# Patient Record
Sex: MALE | Race: WHITE | NOT HISPANIC OR LATINO | URBAN - METROPOLITAN AREA
[De-identification: names, ages, dates, MRNs, and addresses within clinical notes are randomized per-mention and may not be internally consistent; named-entity substitution may affect disease eponyms.]

---

## 2017-02-13 ENCOUNTER — OUTPATIENT (OUTPATIENT)
Dept: OUTPATIENT SERVICES | Facility: HOSPITAL | Age: 62
LOS: 1 days | End: 2017-02-13
Payer: COMMERCIAL

## 2017-02-13 VITALS
WEIGHT: 169.98 LBS | SYSTOLIC BLOOD PRESSURE: 157 MMHG | RESPIRATION RATE: 16 BRPM | HEART RATE: 79 BPM | OXYGEN SATURATION: 97 % | DIASTOLIC BLOOD PRESSURE: 92 MMHG | TEMPERATURE: 98 F | HEIGHT: 66 IN

## 2017-02-13 DIAGNOSIS — N40.0 BENIGN PROSTATIC HYPERPLASIA WITHOUT LOWER URINARY TRACT SYMPTOMS: ICD-10-CM

## 2017-02-13 DIAGNOSIS — R03.0 ELEVATED BLOOD-PRESSURE READING, WITHOUT DIAGNOSIS OF HYPERTENSION: ICD-10-CM

## 2017-02-13 DIAGNOSIS — Z96.651 PRESENCE OF RIGHT ARTIFICIAL KNEE JOINT: Chronic | ICD-10-CM

## 2017-02-13 DIAGNOSIS — Z01.818 ENCOUNTER FOR OTHER PREPROCEDURAL EXAMINATION: ICD-10-CM

## 2017-02-13 NOTE — H&P PST ADULT - PROBLEM SELECTOR PLAN 2
Instructed to f/u with PCP for management of elevated blood pressure reading. Agrees with plan of care

## 2017-02-13 NOTE — H&P PST ADULT - NEGATIVE GENERAL SYMPTOMS
no sweating/no fever/no anorexia/no weight gain/no polyphagia/no polyuria/no malaise/no fatigue/no chills/no weight loss/no polydipsia

## 2017-02-13 NOTE — H&P PST ADULT - NEGATIVE RESPIRATORY AND THORAX SYMPTOMS
no pleuritic chest pain/no cough/no wheezing/no dyspnea/no hemoptysis no pleuritic chest pain/no wheezing/no dyspnea/no hemoptysis

## 2017-02-13 NOTE — H&P PST ADULT - PMH
Enlarged prostate without lower urinary tract symptoms (luts)    GERD without esophagitis    Hyperlipidemia, unspecified hyperlipidemia type

## 2017-02-13 NOTE — H&P PST ADULT - ASSESSMENT
62 y/o male with PMH of GERD, hyperlipidemia, tobacco use disorder, low SUSAN risk on STOP BANG screen, enlarged prostate w/o LUTS scheduled for cystoscopy, transurethral resection of prostate on 2/17/2017. Patient is at moderate risk for planned procedure 62 y/o male with PMH of GERD, hyperlipidemia, tobacco use disorder, low SUSAN risk on STOP BANG screen, enlarged prostate w/o LUTS scheduled for cystoscopy, transurethral resection of prostate on 2/17/2017. Patient is at low risk for planned procedure

## 2017-02-13 NOTE — H&P PST ADULT - PROBLEM SELECTOR PLAN 1
Scheduled for cystoscopy, transurethral resection of prostate on 2/17/2017. Preoperative instructions discussed and given to patient. Verbalized understanding Scheduled for cystoscopy, transurethral resection of prostate on 2/17/2017. Preoperative instructions discussed and given to patient. Verbalized understanding of same.

## 2017-02-13 NOTE — H&P PST ADULT - HISTORY OF PRESENT ILLNESS
62 y/o male with PMH of GERD, hyperlipidemia and tobacco use disorder is here for presurgical evaluation prior to surgery. Complains of burning sensation with urination and recurrent UTIs. States preliminary cystoscopy in office of urologist revealed cyst in region of prostate. He is scheduled for cystoscopy, transurethral resection of prostate on 2/17/2017

## 2017-02-13 NOTE — H&P PST ADULT - FAMILY HISTORY
Father  Still living? No  Family history of heart disease, Age at diagnosis: Age Unknown     Mother  Still living? No  Family history of heart disease, Age at diagnosis: Age Unknown  Family history of dementia, Age at diagnosis: Age Unknown

## 2017-02-13 NOTE — H&P PST ADULT - NSANTHOSAYNRD_GEN_A_CORE
No. SUSAN screening performed.  STOP BANG Legend: 0-2 = LOW Risk; 3-4 = INTERMEDIATE Risk; 5-8 = HIGH Risk

## 2017-02-16 ENCOUNTER — TRANSCRIPTION ENCOUNTER (OUTPATIENT)
Age: 62
End: 2017-02-16

## 2017-02-17 ENCOUNTER — OUTPATIENT (OUTPATIENT)
Dept: OUTPATIENT SERVICES | Facility: HOSPITAL | Age: 62
LOS: 1 days | Discharge: HOME | End: 2017-02-17
Payer: COMMERCIAL

## 2017-02-17 VITALS
SYSTOLIC BLOOD PRESSURE: 118 MMHG | HEART RATE: 71 BPM | TEMPERATURE: 98 F | DIASTOLIC BLOOD PRESSURE: 76 MMHG | OXYGEN SATURATION: 97 % | HEIGHT: 66 IN | RESPIRATION RATE: 14 BRPM | WEIGHT: 169.98 LBS

## 2017-02-17 VITALS
SYSTOLIC BLOOD PRESSURE: 123 MMHG | OXYGEN SATURATION: 95 % | RESPIRATION RATE: 16 BRPM | HEART RATE: 65 BPM | TEMPERATURE: 98 F | DIASTOLIC BLOOD PRESSURE: 76 MMHG

## 2017-02-17 DIAGNOSIS — Z96.651 PRESENCE OF RIGHT ARTIFICIAL KNEE JOINT: Chronic | ICD-10-CM

## 2017-02-17 DIAGNOSIS — Z01.818 ENCOUNTER FOR OTHER PREPROCEDURAL EXAMINATION: ICD-10-CM

## 2017-02-17 DIAGNOSIS — N40.0 BENIGN PROSTATIC HYPERPLASIA WITHOUT LOWER URINARY TRACT SYMPTOMS: ICD-10-CM

## 2017-02-17 PROCEDURE — 86901 BLOOD TYPING SEROLOGIC RH(D): CPT

## 2017-02-17 PROCEDURE — C1769: CPT

## 2017-02-17 PROCEDURE — 86900 BLOOD TYPING SEROLOGIC ABO: CPT

## 2017-02-17 PROCEDURE — 52648 LASER SURGERY OF PROSTATE: CPT

## 2017-02-17 PROCEDURE — C1889: CPT

## 2017-02-17 PROCEDURE — 86850 RBC ANTIBODY SCREEN: CPT

## 2017-02-17 RX ORDER — PHENAZOPYRIDINE HCL 100 MG
1 TABLET ORAL
Qty: 15 | Refills: 0
Start: 2017-02-17 | End: 2017-02-22

## 2017-02-17 RX ORDER — SODIUM CHLORIDE 9 MG/ML
1000 INJECTION, SOLUTION INTRAVENOUS
Qty: 0 | Refills: 0 | Status: DISCONTINUED | OUTPATIENT
Start: 2017-02-17 | End: 2017-02-25

## 2017-02-17 RX ORDER — SODIUM CHLORIDE 9 MG/ML
3 INJECTION INTRAMUSCULAR; INTRAVENOUS; SUBCUTANEOUS EVERY 8 HOURS
Qty: 0 | Refills: 0 | Status: DISCONTINUED | OUTPATIENT
Start: 2017-02-17 | End: 2017-02-17

## 2017-02-17 RX ORDER — MOXIFLOXACIN HYDROCHLORIDE TABLETS, 400 MG 400 MG/1
1 TABLET, FILM COATED ORAL
Qty: 10 | Refills: 0
Start: 2017-02-17 | End: 2017-02-22

## 2017-02-17 RX ADMIN — SODIUM CHLORIDE 3 MILLILITER(S): 9 INJECTION INTRAMUSCULAR; INTRAVENOUS; SUBCUTANEOUS at 08:28

## 2017-02-17 NOTE — ASU DISCHARGE PLAN (ADULT/PEDIATRIC). - MEDICATION SUMMARY - MEDICATIONS TO TAKE
I will START or STAY ON the medications listed below when I get home from the hospital:    Percocet 5/325 325 mg-5 mg oral tablet  -- 1 tab(s) by mouth every 6 hours MDD:4  -- Caution federal law prohibits the transfer of this drug to any person other  than the person for whom it was prescribed.  May cause drowsiness.  Alcohol may intensify this effect.  Use care when operating dangerous machinery.  This prescription cannot be refilled.  This product contains acetaminophen.  Do not use  with any other product containing acetaminophen to prevent possible liver damage.  Using more of this medication than prescribed may cause serious breathing problems.    -- Indication: For pain    Vytorin 10 mg-20 mg oral tablet  -- 1 tab(s) by mouth once a day  -- Indication: For as previoulsy perscribed    Pyridium 200 mg oral tablet  -- 1 tab(s) by mouth 3 times a day (after meals) MDD:3  -- May discolor urine or feces.  Medication should be taken with plenty of water.  Take with food or milk.    -- Indication: For abx    NexIUM 40 mg oral delayed release capsule  -- 1 cap(s) by mouth once a day  -- Indication: For GERD    Cipro 500 mg oral tablet  -- 1 tab(s) by mouth every 12 hours MDD:2  -- Avoid prolonged or excessive exposure to direct and/or artificial sunlight while taking this medication.  Check with your doctor before becoming pregnant.  Do not take dairy products, antacids, or iron preparations within one hour of this medication.  Finish all this medication unless otherwise directed by prescriber.  Medication should be taken with plenty of water.    -- Indication: For abx

## 2017-02-17 NOTE — ASU DISCHARGE PLAN (ADULT/PEDIATRIC). - NURSING INSTRUCTIONS
Take antibiotic as ordered and complete full dose to prevent antibiotic resistance. Pyridium can change your urine color to orange or red.

## 2017-02-17 NOTE — ASU DISCHARGE PLAN (ADULT/PEDIATRIC). - NOTIFY
Bleeding that does not stop Persistent Nausea and Vomiting/Fever greater than 101/Bleeding that does not stop/Pain not relieved by Medications

## 2017-02-24 DIAGNOSIS — K21.9 GASTRO-ESOPHAGEAL REFLUX DISEASE WITHOUT ESOPHAGITIS: ICD-10-CM

## 2017-02-24 DIAGNOSIS — Z82.49 FAMILY HISTORY OF ISCHEMIC HEART DISEASE AND OTHER DISEASES OF THE CIRCULATORY SYSTEM: ICD-10-CM

## 2017-02-24 DIAGNOSIS — E78.5 HYPERLIPIDEMIA, UNSPECIFIED: ICD-10-CM

## 2017-02-24 DIAGNOSIS — Z81.8 FAMILY HISTORY OF OTHER MENTAL AND BEHAVIORAL DISORDERS: ICD-10-CM

## 2017-02-24 DIAGNOSIS — N40.1 BENIGN PROSTATIC HYPERPLASIA WITH LOWER URINARY TRACT SYMPTOMS: ICD-10-CM

## 2017-04-13 PROCEDURE — G0463: CPT

## 2017-04-13 PROCEDURE — 86850 RBC ANTIBODY SCREEN: CPT

## 2017-04-13 PROCEDURE — 86901 BLOOD TYPING SEROLOGIC RH(D): CPT

## 2017-04-13 PROCEDURE — 86900 BLOOD TYPING SEROLOGIC ABO: CPT

## 2018-03-22 NOTE — H&P PST ADULT - SYMPTOMS
Advance Care Planning    Advance Care Planning (ACP) Provider Conversation Snapshot    Date of ACP Conversation: 03/22/18  Persons included in Conversation:  patient  Length of ACP Conversation in minutes:  16 minutes    Authorized Decision Maker (if patient is incapable of making informed decisions): This person is:   Healthcare Agent/Medical Power of  under Advance Directive          For Patients with Decision Making Capacity:   Values/Goals: Exploration of values, goals, and preferences if recovery is not expected, even with continued medical treatment in the event of:  Imminent death  Severe, permanent brain injury  \"In these circumstances, what matters most to you? \"  Care focused more on comfort or quality of life.     Conversation Outcomes / Follow-Up Plan:   Recommended completion of Advance Directive form after review of ACP materials and conversation with prospective healthcare agent      Has discussed with kids already after going through process with her  none

## 2019-04-02 PROBLEM — E78.5 HYPERLIPIDEMIA, UNSPECIFIED: Chronic | Status: ACTIVE | Noted: 2017-02-13

## 2019-04-02 PROBLEM — N40.0 BENIGN PROSTATIC HYPERPLASIA WITHOUT LOWER URINARY TRACT SYMPTOMS: Chronic | Status: ACTIVE | Noted: 2017-02-13

## 2019-04-02 PROBLEM — K21.9 GASTRO-ESOPHAGEAL REFLUX DISEASE WITHOUT ESOPHAGITIS: Chronic | Status: ACTIVE | Noted: 2017-02-13

## 2019-04-09 ENCOUNTER — RESULT REVIEW (OUTPATIENT)
Age: 64
End: 2019-04-09

## 2019-04-09 ENCOUNTER — OUTPATIENT (OUTPATIENT)
Dept: OUTPATIENT SERVICES | Facility: HOSPITAL | Age: 64
LOS: 1 days | Discharge: HOME | End: 2019-04-09
Payer: MEDICARE

## 2019-04-09 DIAGNOSIS — Z96.651 PRESENCE OF RIGHT ARTIFICIAL KNEE JOINT: Chronic | ICD-10-CM

## 2019-04-09 PROCEDURE — ZZZZZ: CPT

## 2019-04-16 LAB — SURGICAL PATHOLOGY STUDY: SIGNIFICANT CHANGE UP

## 2020-02-06 NOTE — H&P PST ADULT - NEGATIVE CARDIOVASCULAR SYMPTOMS
FREE:[LAST:[Alexandre],FIRST:[Derick],PHONE:[(   )    -],FAX:[(   )    -],ADDRESS:[[address] [place] [id number] [phone]]],PROVIDER:[TOKEN:[[id number]]]
no peripheral edema/no dyspnea on exertion/no orthopnea/no claudication/no paroxysmal nocturnal dyspnea/no palpitations/no chest pain

## 2020-06-21 NOTE — H&P PST ADULT - PRO ARRIVE FROM
Interval History:  No confusion overnight.  Shortness of breath improving.    Review of Systems   Constitutional: Negative for chills and fever.   Respiratory: Positive for shortness of breath. Negative for wheezing.    Cardiovascular: Negative for chest pain.   Gastrointestinal: Negative for abdominal distention, abdominal pain, constipation, diarrhea, nausea and vomiting.   Genitourinary: Negative for dysuria and frequency.   Musculoskeletal: Negative for arthralgias and myalgias.   Neurological: Negative for light-headedness.   Psychiatric/Behavioral: Negative for agitation and confusion.     Objective:     Vital Signs (Most Recent):  Temp: 97.8 °F (36.6 °C) (06/21/20 0729)  Pulse: (!) 129 (06/21/20 0800)  Resp: 20 (06/21/20 0729)  BP: (!) 176/99 (06/21/20 0729)  SpO2: 96 % (06/21/20 0729) Vital Signs (24h Range):  Temp:  [97.5 °F (36.4 °C)-98.1 °F (36.7 °C)] 97.8 °F (36.6 °C)  Pulse:  [] 129  Resp:  [13-20] 20  SpO2:  [90 %-98 %] 96 %  BP: (133-176)/() 176/99     Weight: 62.5 kg (137 lb 12.6 oz)  Body mass index is 23.65 kg/m².    Intake/Output Summary (Last 24 hours) at 6/21/2020 0859  Last data filed at 6/21/2020 0712  Gross per 24 hour   Intake 150 ml   Output 1900 ml   Net -1750 ml      Physical Exam  Constitutional:       General: She is not in acute distress.     Appearance: She is well-developed.   HENT:      Head: Atraumatic.   Eyes:      Conjunctiva/sclera: Conjunctivae normal.   Neck:      Musculoskeletal: Neck supple.   Cardiovascular:      Rate and Rhythm: Tachycardia present. Rhythm irregular.      Heart sounds: Normal heart sounds. No murmur.   Pulmonary:      Effort: Pulmonary effort is normal.      Breath sounds: Normal breath sounds. No wheezing.   Abdominal:      General: Bowel sounds are normal. There is no distension.      Palpations: Abdomen is soft.      Tenderness: There is no abdominal tenderness.   Musculoskeletal: Normal range of motion.         General: No deformity.       Right lower leg: No edema.      Left lower leg: No edema.   Neurological:      Mental Status: She is alert and oriented to person, place, and time.         Significant Labs: All pertinent labs within the past 24 hours have been reviewed.    Significant Imaging: I have reviewed all pertinent imaging results/findings within the past 24 hours.   home

## 2022-01-12 ENCOUNTER — NON-APPOINTMENT (OUTPATIENT)
Age: 67
End: 2022-01-12

## 2022-01-12 ENCOUNTER — APPOINTMENT (OUTPATIENT)
Dept: OPHTHALMOLOGY | Facility: CLINIC | Age: 67
End: 2022-01-12
Payer: MEDICARE

## 2022-01-12 PROCEDURE — 92025 CPTRIZED CORNEAL TOPOGRAPHY: CPT

## 2022-01-12 PROCEDURE — 92014 COMPRE OPH EXAM EST PT 1/>: CPT

## 2022-01-12 PROCEDURE — 92004 COMPRE OPH EXAM NEW PT 1/>: CPT

## 2022-02-22 RX ORDER — SODIUM CHLORIDE 9 MG/ML
1000 INJECTION, SOLUTION INTRAVENOUS
Refills: 0 | Status: DISCONTINUED | OUTPATIENT
Start: 2022-02-28 | End: 2022-02-28

## 2022-02-24 RX ORDER — ACETAMINOPHEN 500 MG
650 TABLET ORAL EVERY 6 HOURS
Refills: 0 | Status: DISCONTINUED | OUTPATIENT
Start: 2022-02-28 | End: 2022-02-28

## 2022-02-24 NOTE — OPERATIVE REPORT - OPERATIVE RPOSRT DETAILS
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 After uneventful induction of neuroleptic anesthesia, a retrobulbar block consisting of 5-7 cc 2% lidocaine  and 0.75% marcaine was administered around the left eye. A modified van Lint style lid block was also given. The patient was then prepped and draped in the usual sterile fashion. A wire lid speculum was inserted into the operative eye giving a wide palpebral fissure. This procedure is going to be performed with an assistant surgeon whose extra hands are required to properly manage the surgery  Using Castroviejo calipers it was determined that the chosen donor size would adequately cover the posterior corneal surface. A paul knife was used to perform appropriate paracenteses through clear cornea at the temporal and nasal oblique meridians. An anterior chamber maintainer was passed through the inferonasal paracentesis and balanced salt solution was infused to maintain the anterior chamber. Attention was then addressed to the precut donor tissue. The donor was cut to the appropriate size using a punch block and trephine, and the lenticule was covered with storage medium and set aside for later use.  Attention was then readdressed to the recipient cornea. A reverse Sinsky hook was used to detach the failed corneal lenticule. A keratome was used to create a clear corneal incision just inside the temporal limbus. A Descemet's stripping instrument was used to remove the failed corneal lenticule from the scored area. The keratome was then used to widen the temporal corneal wound to approximately 4.5 mm. The donor lenticule was then brought into the surgical field and loaded into a miniBusin glide  which was placed into the corneal wound. Tan endoforceps were then passed through a paracentesis across the anterior chamber to the temporal corneal wound grasping the donor lenticule and advancing it into the anterior chamber. The donor lenticule spontaneously unfurled endothelial side down.  The anterior chamber maintainer was removed and the paracentesis and corneal wound were closed with 10-0 nylon sutures. All the knots were then buried.  A 30 gauge cannula was placed on a 5 cc syringe filled with either air or 20% SF6 gas and then passed through a paracentesis under the donor lenticule filling the anterior chamber with air or gas to tamponade the lenticule against the inner surface of the cornea.  Topical irrigation of 4 mg dexamethasone and 100 mg cephazolin was administered inferiorly in the conjunctival cul de sac. The lid speculum was removed from the eye and a shield and dressing placed over the eye.  The patient tolerated the procedure well and was to remain in a supine position in the recovery area for approximately 45 minutes before discharge

## 2022-02-25 NOTE — ASU PATIENT PROFILE, ADULT - NSICDXPASTMEDICALHX_GEN_ALL_CORE_FT
PAST MEDICAL HISTORY:  Enlarged prostate without lower urinary tract symptoms (luts)     GERD without esophagitis     Hyperlipidemia, unspecified hyperlipidemia type      PAST MEDICAL HISTORY:  Acid reflux     Enlarged prostate without lower urinary tract symptoms (luts)     GERD without esophagitis     Hyperlipidemia, unspecified hyperlipidemia type     Hypertension

## 2022-02-25 NOTE — ASU PATIENT PROFILE, ADULT - NSICDXPASTSURGICALHX_GEN_ALL_CORE_FT
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

## 2022-02-28 ENCOUNTER — APPOINTMENT (OUTPATIENT)
Dept: OPHTHALMOLOGY | Facility: AMBULATORY SURGERY CENTER | Age: 67
End: 2022-02-28

## 2022-02-28 ENCOUNTER — OUTPATIENT (OUTPATIENT)
Dept: OUTPATIENT SERVICES | Facility: HOSPITAL | Age: 67
LOS: 1 days | Discharge: HOME | End: 2022-02-28
Payer: MEDICARE

## 2022-02-28 ENCOUNTER — RESULT REVIEW (OUTPATIENT)
Age: 67
End: 2022-02-28

## 2022-02-28 ENCOUNTER — NON-APPOINTMENT (OUTPATIENT)
Age: 67
End: 2022-02-28

## 2022-02-28 VITALS
HEART RATE: 71 BPM | TEMPERATURE: 98 F | SYSTOLIC BLOOD PRESSURE: 141 MMHG | DIASTOLIC BLOOD PRESSURE: 79 MMHG | RESPIRATION RATE: 17 BRPM

## 2022-02-28 VITALS
SYSTOLIC BLOOD PRESSURE: 151 MMHG | RESPIRATION RATE: 16 BRPM | WEIGHT: 176.81 LBS | HEART RATE: 64 BPM | OXYGEN SATURATION: 98 % | TEMPERATURE: 98 F | DIASTOLIC BLOOD PRESSURE: 93 MMHG | HEIGHT: 66 IN

## 2022-02-28 DIAGNOSIS — Z94.7 CORNEAL TRANSPLANT STATUS: Chronic | ICD-10-CM

## 2022-02-28 DIAGNOSIS — Z98.890 OTHER SPECIFIED POSTPROCEDURAL STATES: Chronic | ICD-10-CM

## 2022-02-28 DIAGNOSIS — Z96.651 PRESENCE OF RIGHT ARTIFICIAL KNEE JOINT: Chronic | ICD-10-CM

## 2022-02-28 DIAGNOSIS — Z98.49 CATARACT EXTRACTION STATUS, UNSPECIFIED EYE: Chronic | ICD-10-CM

## 2022-02-28 LAB
GRAM STN FLD: SIGNIFICANT CHANGE UP
SPECIMEN SOURCE: SIGNIFICANT CHANGE UP

## 2022-02-28 PROCEDURE — 88304 TISSUE EXAM BY PATHOLOGIST: CPT | Mod: 26

## 2022-02-28 PROCEDURE — 65756 CORNEAL TRNSPL ENDOTHELIAL: CPT | Mod: LT

## 2022-02-28 DEVICE — GAS IOL HEXAFLUORIDE CYL: Type: IMPLANTABLE DEVICE | Site: LEFT | Status: FUNCTIONAL

## 2022-02-28 RX ORDER — EZETIMIBE AND SIMVASTATIN 10; 80 MG/1; MG/1
1 TABLET, FILM COATED ORAL
Qty: 0 | Refills: 0 | DISCHARGE

## 2022-02-28 RX ORDER — POTASSIUM ACETATE 196 MG/ML
10 INJECTION, SOLUTION INTRAVENOUS
Qty: 0 | Refills: 0 | DISCHARGE

## 2022-02-28 RX ORDER — POTASSIUM ACETATE 196 MG/ML
40 INJECTION, SOLUTION INTRAVENOUS
Qty: 0 | Refills: 0 | DISCHARGE

## 2022-02-28 RX ORDER — OFLOXACIN 0.3 %
1 DROPS OPHTHALMIC (EYE)
Refills: 0 | Status: COMPLETED | OUTPATIENT
Start: 2022-02-28 | End: 2022-02-28

## 2022-02-28 RX ORDER — ESOMEPRAZOLE MAGNESIUM 40 MG/1
1 CAPSULE, DELAYED RELEASE ORAL
Qty: 0 | Refills: 0 | DISCHARGE

## 2022-02-28 RX ORDER — ONDANSETRON 8 MG/1
4 TABLET, FILM COATED ORAL ONCE
Refills: 0 | Status: DISCONTINUED | OUTPATIENT
Start: 2022-02-28 | End: 2022-02-28

## 2022-02-28 RX ORDER — GABAPENTIN 400 MG/1
1 CAPSULE ORAL
Qty: 0 | Refills: 0 | DISCHARGE

## 2022-02-28 RX ORDER — ALBUTEROL 90 UG/1
0 AEROSOL, METERED ORAL
Qty: 0 | Refills: 0 | DISCHARGE

## 2022-02-28 RX ORDER — HYDRALAZINE/HYDROCHLOROTHIAZID 50 MG-50MG
0 CAPSULE ORAL
Qty: 0 | Refills: 0 | DISCHARGE

## 2022-02-28 RX ADMIN — Medication 1 DROP(S): at 12:15

## 2022-02-28 RX ADMIN — Medication 1 DROP(S): at 12:20

## 2022-02-28 RX ADMIN — Medication 1 DROP(S): at 12:05

## 2022-02-28 NOTE — OPERATIVE REPORT - OPERATIVE RPOSRT DETAILS
PREOPERATIVE DIAGNOSIS:  __Failed DSAEK___  Left Eye  POSTOPERATIVE DIAGNOSIS:____Failed DSAEK__ Left Eye  OPERATIVE PROCEDURE:  Descemet Stripping Automated Endothelial Keratoplasty Left Eye  IMPLANT: Donor # [], Graft size []  PROCEDURE:  The patient was brought into the operating room and placed supine on the operating room table. After uneventful induction of neuroleptic anesthesia, a retrobulbar block consisting of 5-7 cc 2% lidocaine  and 0.75% marcaine was administered around the left eye. A modified van Lint style lid block was also given. The patient was then prepped and draped in the usual sterile fashion. A wire lid speculum was inserted into the operative eye giving a wide palpebral fissure. This procedure is going to be performed with an assistant surgeon whose extra hands are required to properly manage the surgery  Using Castroviejo calipers it was determined that the chosen donor size would adequately cover the posterior corneal surface. A paul knife was used to perform appropriate paracenteses through clear cornea at the temporal and nasal oblique meridians. An anterior chamber maintainer was passed through the inferonasal paracentesis and balanced salt solution was infused to maintain the anterior chamber. Attention was then addressed to the precut donor tissue. The donor was cut to the appropriate size using a punch block and trephine, and the lenticule was covered with storage medium and set aside for later use.  Attention was then readdressed to the recipient cornea. A reverse Sinsky hook was used to detach the failed lenticule. A keratome was used to create a clear corneal incision just inside the temporal limbus. The keratome was then used to widen the temporal corneal wound to approximately 4.5 mm. The donor lenticule was then brought into the surgical field and loaded into a miniBusin glide  which was placed into the corneal wound. Tan endoforceps were then passed through a paracentesis across the anterior chamber to the temporal corneal wound grasping the donor lenticule and advancing it into the anterior chamber. The donor lenticule spontaneously unfurled endothelial side down.  The anterior chamber maintainer was removed and the paracentesis and corneal wound were closed with 10-0 nylon sutures. All the knots were then buried.  A 30 gauge cannula was placed on a 5 cc syringe filled with either air or 20% SF6 gas and then passed through a paracentesis under the donor lenticule filling the anterior chamber with air or gas to tamponade the lenticule against the inner surface of the cornea.  Topical irrigation of 4 mg dexamethasone and 100 mg cephazolin was administered inferiorly in the conjunctival cul de sac. The lid speculum was removed from the eye and a shield and dressing placed over the eye.  The patient tolerated the procedure well and was to remain in a supine position in the recovery area for approximately 45 minutes before discharge   PREOPERATIVE DIAGNOSIS:  __Failed DSAEK___  Left Eye  POSTOPERATIVE DIAGNOSIS:____Failed DSAEK__ Left Eye  OPERATIVE PROCEDURE:  Descemet Stripping Automated Endothelial Keratoplasty Left Eye  IMPLANT: Donor # [[id number]], Graft size [8.25 mm]  PROCEDURE:  The patient was brought into the operating room and placed supine on the operating room table. After uneventful induction of neuroleptic anesthesia, a retrobulbar block consisting of 5-7 cc 2% lidocaine  and 0.75% marcaine was administered around the left eye. A modified van Lint style lid block was also given. The patient was then prepped and draped in the usual sterile fashion. A wire lid speculum was inserted into the operative eye giving a wide palpebral fissure. This procedure is going to be performed with an assistant surgeon whose extra hands are required to properly manage the surgery  Using Castroviejo calipers it was determined that the chosen donor size would adequately cover the posterior corneal surface. A paul knife was used to perform appropriate paracenteses through clear cornea at the temporal and nasal oblique meridians. An anterior chamber maintainer was passed through the inferonasal paracentesis and balanced salt solution was infused to maintain the anterior chamber. Attention was then addressed to the precut donor tissue. The donor was cut to the appropriate size using a punch block and trephine, and the lenticule was covered with storage medium and set aside for later use.  Attention was then readdressed to the recipient cornea. A reverse Sinsky hook was used to detach the failed lenticule. A keratome was used to create a clear corneal incision just inside the temporal limbus. The keratome was then used to widen the temporal corneal wound to approximately 4.5 mm. The donor lenticule was then brought into the surgical field and loaded into a miniBusin glide  which was placed into the corneal wound. Tan endoforceps were then passed through a paracentesis across the anterior chamber to the temporal corneal wound grasping the donor lenticule and advancing it into the anterior chamber. The donor lenticule spontaneously unfurled endothelial side down.  The anterior chamber maintainer was removed and the paracentesis and corneal wound were closed with 10-0 nylon sutures. All the knots were then buried.  A 30 gauge cannula was placed on a 5 cc syringe filled with either air or 20% SF6 gas and then passed through a paracentesis under the donor lenticule filling the anterior chamber with air or gas to tamponade the lenticule against the inner surface of the cornea.  Topical irrigation of 4 mg dexamethasone and 100 mg cephazolin was administered inferiorly in the conjunctival cul de sac. The lid speculum was removed from the eye and a shield and dressing placed over the eye.  The patient tolerated the procedure well and was to remain in a supine position in the recovery area for approximately 45 minutes before discharge

## 2022-03-01 ENCOUNTER — APPOINTMENT (OUTPATIENT)
Dept: OPHTHALMOLOGY | Facility: CLINIC | Age: 67
End: 2022-03-01
Payer: MEDICARE

## 2022-03-01 ENCOUNTER — NON-APPOINTMENT (OUTPATIENT)
Age: 67
End: 2022-03-01

## 2022-03-01 PROCEDURE — 99024 POSTOP FOLLOW-UP VISIT: CPT

## 2022-03-08 ENCOUNTER — NON-APPOINTMENT (OUTPATIENT)
Age: 67
End: 2022-03-08

## 2022-03-08 ENCOUNTER — APPOINTMENT (OUTPATIENT)
Dept: OPHTHALMOLOGY | Facility: CLINIC | Age: 67
End: 2022-03-08
Payer: MEDICARE

## 2022-03-08 PROBLEM — I10 ESSENTIAL (PRIMARY) HYPERTENSION: Chronic | Status: ACTIVE | Noted: 2022-02-28

## 2022-03-08 PROBLEM — K21.9 GASTRO-ESOPHAGEAL REFLUX DISEASE WITHOUT ESOPHAGITIS: Chronic | Status: ACTIVE | Noted: 2022-02-28

## 2022-03-08 PROCEDURE — 99024 POSTOP FOLLOW-UP VISIT: CPT

## 2022-03-22 LAB
CULTURE RESULTS: NO GROWTH — SIGNIFICANT CHANGE UP
SPECIMEN SOURCE: SIGNIFICANT CHANGE UP

## 2022-03-25 LAB — SURGICAL PATHOLOGY STUDY: SIGNIFICANT CHANGE UP

## 2022-03-29 ENCOUNTER — NON-APPOINTMENT (OUTPATIENT)
Age: 67
End: 2022-03-29

## 2022-03-29 ENCOUNTER — APPOINTMENT (OUTPATIENT)
Dept: OPHTHALMOLOGY | Facility: CLINIC | Age: 67
End: 2022-03-29
Payer: MEDICARE

## 2022-03-29 PROCEDURE — 99024 POSTOP FOLLOW-UP VISIT: CPT

## 2022-04-19 ENCOUNTER — APPOINTMENT (OUTPATIENT)
Dept: OPHTHALMOLOGY | Facility: CLINIC | Age: 67
End: 2022-04-19
Payer: MEDICARE

## 2022-04-19 ENCOUNTER — NON-APPOINTMENT (OUTPATIENT)
Age: 67
End: 2022-04-19

## 2022-04-19 PROCEDURE — 99024 POSTOP FOLLOW-UP VISIT: CPT

## 2022-05-24 ENCOUNTER — NON-APPOINTMENT (OUTPATIENT)
Age: 67
End: 2022-05-24

## 2022-05-24 ENCOUNTER — APPOINTMENT (OUTPATIENT)
Dept: OPHTHALMOLOGY | Facility: CLINIC | Age: 67
End: 2022-05-24
Payer: MEDICARE

## 2022-05-24 ENCOUNTER — TRANSCRIPTION ENCOUNTER (OUTPATIENT)
Age: 67
End: 2022-05-24

## 2022-05-24 PROCEDURE — 92025 CPTRIZED CORNEAL TOPOGRAPHY: CPT

## 2022-05-24 PROCEDURE — 99024 POSTOP FOLLOW-UP VISIT: CPT

## 2022-08-17 ENCOUNTER — NON-APPOINTMENT (OUTPATIENT)
Age: 67
End: 2022-08-17

## 2022-08-17 ENCOUNTER — APPOINTMENT (OUTPATIENT)
Dept: OPHTHALMOLOGY | Facility: CLINIC | Age: 67
End: 2022-08-17

## 2022-08-17 PROCEDURE — 92012 INTRM OPH EXAM EST PATIENT: CPT

## 2022-09-21 NOTE — ASU PATIENT PROFILE, ADULT - AS SC BRADEN FRICTION
Airway  Performed by: Alistair Harmon CRNA  Authorized by: Dawson Long MD     Final Airway Type:  Supraglottic airway  Final Supraglottic Airway:  Unique  SGA Size*:  4  Attempts*:  1   Patient Identified, Procedure confirmed, Emergency equipment available and Safety protocols followed  Location:  OR  Urgency:  Elective  Difficult Airway: No    Indications for Airway Management:  Anesthesia  Spontaneous Ventilation: absent    Sedation Level:  Anesthetized  Mask Difficulty Assessment:  0 - not attempted  Performed By:  Anesthesiologist and CRNA  Anesthesiologist:  Dawson Long MD  CRNA:  Alistair Harmon CRNA    
(3) no apparent problem

## 2023-02-22 ENCOUNTER — NON-APPOINTMENT (OUTPATIENT)
Age: 68
End: 2023-02-22

## 2023-02-22 ENCOUNTER — APPOINTMENT (OUTPATIENT)
Dept: OPHTHALMOLOGY | Facility: CLINIC | Age: 68
End: 2023-02-22
Payer: MEDICARE

## 2023-02-22 PROCEDURE — 92286 ANT SGM IMG I&R SPECLR MIC: CPT

## 2023-02-22 PROCEDURE — 92250 FUNDUS PHOTOGRAPHY W/I&R: CPT

## 2023-02-22 PROCEDURE — 92014 COMPRE OPH EXAM EST PT 1/>: CPT

## 2023-03-09 PROBLEM — Z00.00 ENCOUNTER FOR PREVENTIVE HEALTH EXAMINATION: Status: ACTIVE | Noted: 2023-03-09

## 2024-06-21 NOTE — ASU PATIENT PROFILE, ADULT - PMH
Enlarged prostate without lower urinary tract symptoms (luts)    GERD without esophagitis    Hyperlipidemia, unspecified hyperlipidemia type
oriented to person, place, time and situation

## 2024-08-05 NOTE — ASU PREOP CHECKLIST - HAIR REMOVAL
Increase Metoprolol  Continue Olmsartan    Orders:    metoprolol succinate XL (Toprol-XL) 50 mg 24 hr tablet; Take 1 tablet (50 mg) by mouth once daily.    olmesartan-hydrochlorothiazide (BENIcar HCT) 40-25 mg tablet; Take 1 tablet by mouth once daily.    metoprolol succinate XL (Toprol XL) 100 mg 24 hr tablet; Take 1 tablet (100 mg) by mouth once daily. Do not crush or chew.     hair removal not indicated

## 2024-11-15 NOTE — H&P PST ADULT - NS PRO CESSATION MED OFFERED
long history of tobacco abuse  noted wheezing intermittently today  11/11/2024 CT chest PE protocol, negative for PE but presence of mild centrilobular emphysema noted  patient ordered for azithromycin, Xopenex and IV steroids per primary team   refused

## 2025-03-05 ENCOUNTER — APPOINTMENT (OUTPATIENT)
Dept: OPHTHALMOLOGY | Facility: CLINIC | Age: 70
End: 2025-03-05
Payer: MEDICARE

## 2025-03-05 ENCOUNTER — NON-APPOINTMENT (OUTPATIENT)
Age: 70
End: 2025-03-05

## 2025-03-05 PROCEDURE — 92012 INTRM OPH EXAM EST PATIENT: CPT

## (undated) DEVICE — PETRI DISH MED 3.5"

## (undated) DEVICE — GOWN ROYAL SILK XL

## (undated) DEVICE — PACK ANTERIOR SEGMENT

## (undated) DEVICE — KNIFE ALCON STANDARD FULL HANDLE 15 DEG (PINK)

## (undated) DEVICE — NDL HYPO SAFE 18G X 1.5" (PINK)

## (undated) DEVICE — DRAPE MICROSCOPE KNOB COVER SMALL (2 PCS)

## (undated) DEVICE — FILTER SYR 22 MICRONS

## (undated) DEVICE — SYR LUER LOK 10CC

## (undated) DEVICE — CULTURESWAB WITHOUT CHARCOAL

## (undated) DEVICE — ACM SELF RETAINING 20G

## (undated) DEVICE — SYR LUER LOK 3CC

## (undated) DEVICE — CENTURION PAK FACO ACTIVE INTREPID FMS 0.9 MM ULTRA

## (undated) DEVICE — NDL HYPO REGULAR BEVEL 30G X .5"

## (undated) DEVICE — PREP BETADINE 5% STERILE OPTHALMIC SOLUTION

## (undated) DEVICE — NDL RETROBULBAR VISITEC 25X1.5

## (undated) DEVICE — MARKING PEN DEVON DUAL TIP W RULER

## (undated) DEVICE — PUNCH TREPH DISP STRL 8.25MM

## (undated) DEVICE — DRSG TAPE TRANSPORE 1"

## (undated) DEVICE — VENODYNE/SCD SLEEVE CALF MEDIUM

## (undated) DEVICE — WARMING BLANKET LOWER ADULT

## (undated) DEVICE — DRAPE MEDIUM SHEET 44" X 70"

## (undated) DEVICE — KNIFE FULL HANDLE ANGLE 2.75MM

## (undated) DEVICE — Device

## (undated) DEVICE — STOPCOCK 4-WAY

## (undated) DEVICE — GLV 7.5 PROTEXIS (WHITE)

## (undated) DEVICE — CANNULA IRR AC CHAMBER 8MM BEND

## (undated) DEVICE — TUBING ALARIS PUMP MODULE NON-DEHP

## (undated) DEVICE — SUT NYLON 10-0 12" CU-5

## (undated) DEVICE — SYR LUER LOK 5CC

## (undated) DEVICE — SOL IRR BAL SALT 500ML

## (undated) DEVICE — NDL HYPO SAFE 25G X 5/8" (ORANGE)

## (undated) DEVICE — SPEAR CELLULOSE 40410